# Patient Record
Sex: FEMALE | Race: WHITE | HISPANIC OR LATINO | URBAN - METROPOLITAN AREA
[De-identification: names, ages, dates, MRNs, and addresses within clinical notes are randomized per-mention and may not be internally consistent; named-entity substitution may affect disease eponyms.]

---

## 2021-06-30 ENCOUNTER — OFFICE VISIT (OUTPATIENT)
Dept: URBAN - METROPOLITAN AREA CLINIC 27 | Facility: CLINIC | Age: 56
End: 2021-06-30

## 2021-06-30 PROBLEM — 267024001 ABNORMAL WEIGHT LOSS: Status: ACTIVE | Noted: 2021-06-30

## 2021-06-30 PROBLEM — 166603001 ABNORMAL RESULTS OF LIVER FUNCTION STUDIES: Status: ACTIVE | Noted: 2021-06-30

## 2021-06-30 PROBLEM — 442182001 IMAGING OF GASTROINTESTINAL TRACT ABNORMAL: Status: ACTIVE | Noted: 2021-06-30

## 2021-06-30 LAB
A/G RATIO: (no result)
ALBUMIN: 4.5
ALK PHOS: 126
BILI DIRECT: 0.1
BILI INDIREC: (no result)
BILI TOTAL: 0.5
GLOBULIN TOT: (no result)
PROTEIN, TOT: 7.3
SGOT (AST): 66
SGPT (ALT): 19

## 2021-08-31 ENCOUNTER — OFFICE VISIT (OUTPATIENT)
Dept: URBAN - METROPOLITAN AREA CLINIC 27 | Facility: CLINIC | Age: 56
End: 2021-08-31

## 2021-09-02 ENCOUNTER — LAB OUTSIDE AN ENCOUNTER (OUTPATIENT)
Dept: URBAN - METROPOLITAN AREA CLINIC 121 | Facility: CLINIC | Age: 56
End: 2021-09-02

## 2021-09-02 LAB
A/G RATIO: (no result)
ALBUMIN: 4.4
ALK PHOS: 112
ANTI-HAV: REACTIVE
ANTI-HBC: (no result)
ANTIMITOC AB: NEGATIVE
BILI DIRECT: 0.1
BILI INDIREC: (no result)
BILI TOTAL: 0.6
CERULOPLASMI: 30
GLOBULIN TOT: (no result)
HBSAG: (no result)
HEP C AB: (no result)
PROTEIN, TOT: 7.3
SGOT (AST): 73
SGPT (ALT): 22
ZZ-GE-UNK: (no result)
ZZ-GE-UNK: (no result)
ZZ-GE-UNK: 0.01
ZZ-GE-UNK: POSITIVE
ZZ-GE-UNK: REACTIVE

## 2022-04-30 ENCOUNTER — TELEPHONE ENCOUNTER (OUTPATIENT)
Dept: URBAN - METROPOLITAN AREA CLINIC 121 | Facility: CLINIC | Age: 57
End: 2022-04-30

## 2022-05-01 ENCOUNTER — TELEPHONE ENCOUNTER (OUTPATIENT)
Dept: URBAN - METROPOLITAN AREA CLINIC 121 | Facility: CLINIC | Age: 57
End: 2022-05-01

## 2022-05-01 RX ORDER — ELECTROLYTES/DEXTROSE
SOLUTION, ORAL ORAL
Status: ACTIVE | COMMUNITY
Start: 2021-06-30

## 2022-07-21 ENCOUNTER — TELEPHONE ENCOUNTER (OUTPATIENT)
Dept: URBAN - METROPOLITAN AREA CLINIC 27 | Facility: CLINIC | Age: 57
End: 2022-07-21

## 2024-06-26 ENCOUNTER — DASHBOARD ENCOUNTERS (OUTPATIENT)
Age: 59
End: 2024-06-26

## 2024-06-26 ENCOUNTER — LAB OUTSIDE AN ENCOUNTER (OUTPATIENT)
Dept: URBAN - METROPOLITAN AREA CLINIC 27 | Facility: CLINIC | Age: 59
End: 2024-06-26

## 2024-06-26 ENCOUNTER — OFFICE VISIT (OUTPATIENT)
Dept: URBAN - METROPOLITAN AREA CLINIC 27 | Facility: CLINIC | Age: 59
End: 2024-06-26
Payer: OTHER GOVERNMENT

## 2024-06-26 VITALS
SYSTOLIC BLOOD PRESSURE: 95 MMHG | WEIGHT: 116 LBS | DIASTOLIC BLOOD PRESSURE: 59 MMHG | HEART RATE: 65 BPM | BODY MASS INDEX: 19.81 KG/M2 | HEIGHT: 64 IN

## 2024-06-26 DIAGNOSIS — R93.3 ABNORMAL FINDINGS ON DIAGNOSTIC IMAGING OF OTHER PARTS OF DIGESTIVE TRACT: ICD-10-CM

## 2024-06-26 DIAGNOSIS — F50.89 PICA: ICD-10-CM

## 2024-06-26 DIAGNOSIS — R94.5 ABNORMAL RESULTS OF LIVER FUNCTION STUDIES: ICD-10-CM

## 2024-06-26 PROCEDURE — 99214 OFFICE O/P EST MOD 30 MIN: CPT | Performed by: PHYSICIAN ASSISTANT

## 2024-06-26 RX ORDER — ELECTROLYTES/DEXTROSE
SOLUTION, ORAL ORAL
Status: ACTIVE | COMMUNITY
Start: 2021-06-30

## 2024-06-27 LAB
ABSOLUTE BASOPHILS: 90
ABSOLUTE EOSINOPHILS: 40
ABSOLUTE LYMPHOCYTES: 853
ABSOLUTE MONOCYTES: 252
ABSOLUTE NEUTROPHILS: 2365
ALBUMIN/GLOBULIN RATIO: 1.5
ALBUMIN: 4.5
ALKALINE PHOSPHATASE: 71
ALT (SGPT): 10
AST (SGOT): 50
BASOPHILS: 2.5
BILIRUBIN, DIRECT: 0.1
BILIRUBIN, INDIRECT: 0.3
BILIRUBIN, TOTAL: 0.4
EOSINOPHILS: 1.1
FERRITIN, SERUM: 2
GLOBULIN: 3
HEMATOCRIT: 31.8
HEMOGLOBIN: 8.9
IRON BIND.CAP.(TIBC): 412
IRON SATURATION: 4
IRON: 17
LYMPHOCYTES: 23.7
MCH: 20.5
MCHC: 28
MCV: 73.3
MONOCYTES: 7
MPV: 10.7
NEUTROPHILS: 65.7
PLATELET COUNT: 366
PROTEIN, TOTAL: 7.5
RDW: 18.3
RED BLOOD CELL COUNT: 4.34
WHITE BLOOD CELL COUNT: 3.6

## 2024-06-28 ENCOUNTER — TELEPHONE ENCOUNTER (OUTPATIENT)
Dept: URBAN - METROPOLITAN AREA CLINIC 27 | Facility: CLINIC | Age: 59
End: 2024-06-28

## 2024-06-28 ENCOUNTER — LAB OUTSIDE AN ENCOUNTER (OUTPATIENT)
Dept: URBAN - METROPOLITAN AREA CLINIC 27 | Facility: CLINIC | Age: 59
End: 2024-06-28

## 2024-06-28 PROBLEM — 14077003: Status: ACTIVE | Noted: 2024-06-28

## 2024-07-09 ENCOUNTER — TELEPHONE ENCOUNTER (OUTPATIENT)
Dept: URBAN - METROPOLITAN AREA CLINIC 27 | Facility: CLINIC | Age: 59
End: 2024-07-09

## 2024-08-21 ENCOUNTER — OFFICE VISIT (OUTPATIENT)
Dept: URBAN - METROPOLITAN AREA CLINIC 27 | Facility: CLINIC | Age: 59
End: 2024-08-21
Payer: OTHER GOVERNMENT

## 2024-08-21 VITALS
SYSTOLIC BLOOD PRESSURE: 104 MMHG | WEIGHT: 117.4 LBS | BODY MASS INDEX: 20.04 KG/M2 | DIASTOLIC BLOOD PRESSURE: 64 MMHG | HEIGHT: 64 IN | HEART RATE: 67 BPM

## 2024-08-21 DIAGNOSIS — R74.01 HIGH TRANSAMINASE LEVELS: ICD-10-CM

## 2024-08-21 DIAGNOSIS — D50.8 ACHLORHYDRIC ANEMIA: ICD-10-CM

## 2024-08-21 DIAGNOSIS — K86.2 PANCREATIC CYST: ICD-10-CM

## 2024-08-21 PROCEDURE — 99244 OFF/OP CNSLTJ NEW/EST MOD 40: CPT | Performed by: INTERNAL MEDICINE

## 2024-08-21 PROCEDURE — 99204 OFFICE O/P NEW MOD 45 MIN: CPT | Performed by: INTERNAL MEDICINE

## 2024-08-21 RX ORDER — ELECTROLYTES/DEXTROSE
SOLUTION, ORAL ORAL
Status: ACTIVE | COMMUNITY
Start: 2021-06-30

## 2024-08-21 NOTE — HPI-ZZZTODAY'S VISIT
Patient here at the request of Lisa Garcia NP ([facility]) for evaluation of iron deficiency anemia. She was seen in this office in late June and labs at that time showed a hemoglobin of 8.9, MCV 73, ferritin 2, iron sat 4%, iron 17. Her liver enzymes were normal aside from an AST of 50. She was supposed to have an upper endoscopy last month to further evaluate the iron deficiency anemia, but this appointment was not made (nor was she made aware of these lab results). She was seen in her primary care doctor's office last month, and was noted to have a hemoglobin of 9.5, MCV 77. She started taking an iron supplement last week and was sent here for evaluation. She currently has no GI symptoms. She has not had any rectal bleeding. She does not use NSAIDs. She has a history of elevated liver enzymes >3 years for which serologic evaluation in 2021 was unremarkable. She underwent MRI of the abdomen in 2021 which revealed a small subcentimeter cyst in the pancreatic body. A follow-up MRI in 2022 again showed a stable subcentimeter pancreatic/peripancreatic cyst in the pancreatic body. Small hepatic cysts were also seen. She states that she was apparently found to be iron deficient and anemic last year as well, and took an iron supplement for a only a few weeks. Her last colonoscopy was in 2017; this was a normal exam. She has not had a prior endoscopy. There is no family history of colon cancer, polyps, or anemia. A FIT card was negative in 2021. She is immune to hepatitis A and B. Her AST has been stably mildly elevated since 2021. It was 55 per recent labs. She has an appointment with a hematologist in 2 weeks.

## 2024-08-22 ENCOUNTER — LAB OUTSIDE AN ENCOUNTER (OUTPATIENT)
Dept: URBAN - METROPOLITAN AREA CLINIC 27 | Facility: CLINIC | Age: 59
End: 2024-08-22

## 2024-08-29 ENCOUNTER — TELEPHONE ENCOUNTER (OUTPATIENT)
Dept: URBAN - METROPOLITAN AREA CLINIC 27 | Facility: CLINIC | Age: 59
End: 2024-08-29

## 2024-08-29 ENCOUNTER — OFFICE VISIT (OUTPATIENT)
Dept: URBAN - METROPOLITAN AREA SURGERY CENTER 7 | Facility: SURGERY CENTER | Age: 59
End: 2024-08-29

## 2024-08-29 ENCOUNTER — LAB OUTSIDE AN ENCOUNTER (OUTPATIENT)
Dept: URBAN - METROPOLITAN AREA CLINIC 27 | Facility: CLINIC | Age: 59
End: 2024-08-29

## 2024-08-29 RX ORDER — ELECTROLYTES/DEXTROSE
SOLUTION, ORAL ORAL
Status: ACTIVE | COMMUNITY
Start: 2021-06-30

## 2024-08-29 RX ORDER — POLYETHYLENE GLYCOL 3350, SODIUM SULFATE ANHYDROUS, SODIUM BICARBONATE, SODIUM CHLORIDE, POTASSIUM CHLORIDE 236; 22.74; 6.74; 5.86; 2.97 G/4L; G/4L; G/4L; G/4L; G/4L
4000 MILLITERS POWDER, FOR SOLUTION ORAL ONCE
Qty: 4000 MILLILITER | Refills: 0 | OUTPATIENT
Start: 2024-08-29 | End: 2024-08-30

## 2024-08-30 LAB
ABSOLUTE BASOPHILS: 50
ABSOLUTE EOSINOPHILS: 152
ABSOLUTE LYMPHOCYTES: 964
ABSOLUTE MONOCYTES: 211
ABSOLUTE NEUTROPHILS: 1724
BASOPHILS: 1.6
CBC MORPHOLOGY: (no result)
COMMENT(S): (no result)
EOSINOPHILS: 4.9
FERRITIN, SERUM: 18
HEMATOCRIT: 37.3
HEMOGLOBIN: 10.6
IRON BIND.CAP.(TIBC): 339
IRON SATURATION: 9
IRON: 29
LYMPHOCYTES: 31.1
MCH: 24.1
MCHC: 28.4
MCV: 84.8
MONOCYTES: 6.8
MPV: 9.7
NEUTROPHILS: 55.6
PLATELET COUNT: 304
RDW: 23
RED BLOOD CELL COUNT: 4.4
WHITE BLOOD CELL COUNT: 3.1

## 2024-09-03 ENCOUNTER — TELEPHONE ENCOUNTER (OUTPATIENT)
Dept: URBAN - METROPOLITAN AREA CLINIC 27 | Facility: CLINIC | Age: 59
End: 2024-09-03

## 2024-09-04 ENCOUNTER — CLAIMS CREATED FROM THE CLAIM WINDOW (OUTPATIENT)
Dept: URBAN - METROPOLITAN AREA SURGERY CENTER 7 | Facility: SURGERY CENTER | Age: 59
End: 2024-09-04
Payer: OTHER GOVERNMENT

## 2024-09-04 ENCOUNTER — TELEPHONE ENCOUNTER (OUTPATIENT)
Dept: URBAN - METROPOLITAN AREA CLINIC 27 | Facility: CLINIC | Age: 59
End: 2024-09-04

## 2024-09-04 DIAGNOSIS — D50.8 OTHER IRON DEFICIENCY ANEMIA: ICD-10-CM

## 2024-09-04 DIAGNOSIS — D50.9 IRON DEFICIENCY ANEMIA, UNSPECIFIED: ICD-10-CM

## 2024-09-04 DIAGNOSIS — K64.0 GRADE I INTERNAL HEMORRHOIDS: ICD-10-CM

## 2024-09-04 PROCEDURE — 45378 DIAGNOSTIC COLONOSCOPY: CPT | Performed by: INTERNAL MEDICINE

## 2024-09-04 PROCEDURE — 00811 ANES LWR INTST NDSC NOS: CPT | Performed by: NURSE ANESTHETIST, CERTIFIED REGISTERED

## 2024-09-04 RX ORDER — ELECTROLYTES/DEXTROSE
SOLUTION, ORAL ORAL
Status: ACTIVE | COMMUNITY
Start: 2021-06-30

## 2024-09-07 ENCOUNTER — WEB ENCOUNTER (OUTPATIENT)
Dept: URBAN - METROPOLITAN AREA CLINIC 27 | Facility: CLINIC | Age: 59
End: 2024-09-07

## 2024-09-17 ENCOUNTER — TELEPHONE ENCOUNTER (OUTPATIENT)
Dept: URBAN - METROPOLITAN AREA CLINIC 27 | Facility: CLINIC | Age: 59
End: 2024-09-17

## 2024-09-17 RX ORDER — VONOPRAZAN FUMARATE, AMOXICILLIN AND CLARITHROMYCIN 20-500-500
1 TABLET KIT ORAL THREE TIMES A DAY
Qty: 42 TABLET | Refills: 0 | OUTPATIENT
Start: 2024-09-17 | End: 2024-10-01

## 2024-09-23 ENCOUNTER — TELEPHONE ENCOUNTER (OUTPATIENT)
Dept: URBAN - METROPOLITAN AREA CLINIC 27 | Facility: CLINIC | Age: 59
End: 2024-09-23

## 2024-09-23 RX ORDER — LEVOFLOXACIN 250 MG/1
2 TABLETS TABLET, FILM COATED ORAL ONCE A DAY
Qty: 14 | Refills: 0 | OUTPATIENT
Start: 2024-09-23 | End: 2024-10-07

## 2024-09-23 RX ORDER — DOXYCYCLINE HYCLATE 100 MG/1
1 CAPSULE CAPSULE, GELATIN COATED ORAL ONCE A DAY
Qty: 14 CAPSULE | Refills: 0 | OUTPATIENT
Start: 2024-09-23 | End: 2024-10-07

## 2024-09-23 RX ORDER — OMEPRAZOLE 20 MG/1
1 TABLET 30 MINUTES BEFORE MORNING MEAL TABLET, ORALLY DISINTEGRATING, DELAYED RELEASE ORAL TWICE A DAY
Qty: 28 | Refills: 0 | OUTPATIENT
Start: 2024-09-23

## 2024-09-24 ENCOUNTER — WEB ENCOUNTER (OUTPATIENT)
Dept: URBAN - METROPOLITAN AREA CLINIC 27 | Facility: CLINIC | Age: 59
End: 2024-09-24

## 2024-09-25 ENCOUNTER — ERX REFILL RESPONSE (OUTPATIENT)
Dept: URBAN - METROPOLITAN AREA CLINIC 27 | Facility: CLINIC | Age: 59
End: 2024-09-25

## 2024-09-25 RX ORDER — LEVOFLOXACIN 250 MG/1
ONE TABLET TABLET, FILM COATED ORAL ONCE A DAY
Qty: 14 | Refills: 0 | OUTPATIENT

## 2024-09-25 RX ORDER — LEVOFLOXACIN 250 MG/1
2 TABLETS TABLET, FILM COATED ORAL ONCE A DAY
Qty: 14 | Refills: 0 | OUTPATIENT

## 2024-09-26 ENCOUNTER — WEB ENCOUNTER (OUTPATIENT)
Dept: URBAN - METROPOLITAN AREA CLINIC 27 | Facility: CLINIC | Age: 59
End: 2024-09-26

## 2024-11-19 ENCOUNTER — TELEPHONE ENCOUNTER (OUTPATIENT)
Dept: URBAN - METROPOLITAN AREA CLINIC 27 | Facility: CLINIC | Age: 59
End: 2024-11-19

## 2024-11-24 ENCOUNTER — LAB OUTSIDE AN ENCOUNTER (OUTPATIENT)
Dept: URBAN - METROPOLITAN AREA CLINIC 27 | Facility: CLINIC | Age: 59
End: 2024-11-24

## 2024-12-04 ENCOUNTER — OFFICE VISIT (OUTPATIENT)
Dept: URBAN - METROPOLITAN AREA CLINIC 26 | Facility: CLINIC | Age: 59
End: 2024-12-04

## 2024-12-04 ENCOUNTER — LAB OUTSIDE AN ENCOUNTER (OUTPATIENT)
Dept: URBAN - METROPOLITAN AREA CLINIC 27 | Facility: CLINIC | Age: 59
End: 2024-12-04

## 2024-12-09 ENCOUNTER — TELEPHONE ENCOUNTER (OUTPATIENT)
Dept: URBAN - METROPOLITAN AREA CLINIC 27 | Facility: CLINIC | Age: 59
End: 2024-12-09

## 2024-12-09 LAB — H PYLORI BREATH TEST: DETECTED

## 2024-12-09 RX ORDER — METRONIDAZOLE 500 MG/1
1 TABLET TABLET ORAL
Qty: 42 TABLET | Refills: 0 | OUTPATIENT
Start: 2024-12-10 | End: 2024-12-24

## 2024-12-09 RX ORDER — OMEPRAZOLE 20 MG/1
1 TABLET 1/2 TO 1 HOUR BEFORE MORNING MEAL TABLET, DELAYED RELEASE ORAL TWICE A DAY
Qty: 28 | Refills: 0 | OUTPATIENT
Start: 2024-12-10

## 2024-12-09 RX ORDER — TETRACYCLINE HYDROCHLORIDE 500 MG/1
1 CAPSULE ON AN EMPTY STOMACH CAPSULE ORAL
Qty: 56 CAPSULE | Refills: 0 | OUTPATIENT
Start: 2024-12-10 | End: 2024-12-24

## 2025-01-06 ENCOUNTER — TELEPHONE ENCOUNTER (OUTPATIENT)
Dept: URBAN - METROPOLITAN AREA CLINIC 27 | Facility: CLINIC | Age: 60
End: 2025-01-06

## 2025-01-06 RX ORDER — METRONIDAZOLE 500 MG/1
1 TABLET TABLET ORAL
Qty: 14 | Refills: 0
Start: 2024-12-10 | End: 2025-01-21

## 2025-01-24 ENCOUNTER — TELEPHONE ENCOUNTER (OUTPATIENT)
Dept: URBAN - METROPOLITAN AREA CLINIC 27 | Facility: CLINIC | Age: 60
End: 2025-01-24

## 2025-02-04 ENCOUNTER — WEB ENCOUNTER (OUTPATIENT)
Dept: URBAN - METROPOLITAN AREA CLINIC 27 | Facility: CLINIC | Age: 60
End: 2025-02-04

## 2025-02-17 ENCOUNTER — LAB OUTSIDE AN ENCOUNTER (OUTPATIENT)
Dept: URBAN - METROPOLITAN AREA CLINIC 27 | Facility: CLINIC | Age: 60
End: 2025-02-17

## 2025-02-19 LAB — H PYLORI BREATH TEST: NOT DETECTED
